# Patient Record
Sex: FEMALE | Race: WHITE | URBAN - METROPOLITAN AREA
[De-identification: names, ages, dates, MRNs, and addresses within clinical notes are randomized per-mention and may not be internally consistent; named-entity substitution may affect disease eponyms.]

---

## 2019-03-12 ENCOUNTER — NEW PATIENT (OUTPATIENT)
Dept: URBAN - METROPOLITAN AREA CLINIC 24 | Facility: CLINIC | Age: 39
End: 2019-03-12
Payer: COMMERCIAL

## 2019-03-12 DIAGNOSIS — E11.3293 DIABETES MELLITUS TYPE 2 WITH MILD NON-PROLIFERATI: Primary | ICD-10-CM

## 2019-03-12 DIAGNOSIS — H43.812 VITREOUS DEGENERATION, LEFT EYE: ICD-10-CM

## 2019-03-12 PROCEDURE — 92134 CPTRZ OPH DX IMG PST SGM RTA: CPT | Performed by: OPTOMETRIST

## 2019-03-12 PROCEDURE — 92004 COMPRE OPH EXAM NEW PT 1/>: CPT | Performed by: OPTOMETRIST

## 2019-03-12 ASSESSMENT — VISUAL ACUITY
OD: 20/20
OS: 20/20

## 2019-03-12 ASSESSMENT — INTRAOCULAR PRESSURE
OD: 18
OS: 20

## 2021-04-20 ENCOUNTER — OFFICE VISIT (OUTPATIENT)
Dept: URBAN - METROPOLITAN AREA CLINIC 26 | Facility: CLINIC | Age: 41
End: 2021-04-20
Payer: COMMERCIAL

## 2021-04-20 DIAGNOSIS — E11.3311 TYPE 2 DIAB W MODERATE NONPRLF DIAB RTNOP W MACULAR EDEMA, RIGHT EYE: ICD-10-CM

## 2021-04-20 PROCEDURE — 92134 CPTRZ OPH DX IMG PST SGM RTA: CPT | Performed by: OPTOMETRIST

## 2021-04-20 PROCEDURE — 99214 OFFICE O/P EST MOD 30 MIN: CPT | Performed by: OPTOMETRIST

## 2021-04-20 ASSESSMENT — INTRAOCULAR PRESSURE
OS: 20
OD: 16

## 2021-04-20 NOTE — IMPRESSION/PLAN
Impression: Type 2 diab w moderate nonprlf diab rtnop w macular edema, right eye: [id number]. Plan: pt ed of findings. recommend increased glucose control. consult with retina 1-2 weeks.

## 2021-05-13 ENCOUNTER — OFFICE VISIT (OUTPATIENT)
Dept: URBAN - METROPOLITAN AREA CLINIC 24 | Facility: CLINIC | Age: 41
End: 2021-05-13
Payer: COMMERCIAL

## 2021-05-13 DIAGNOSIS — H43.12 VITREOUS HEMORRHAGE, LEFT EYE: ICD-10-CM

## 2021-05-13 DIAGNOSIS — E11.3513 TYPE 2 DIABETES MELLITUS W/ PROLIFERATIVE DIABETIC RETINOPATHY W/ MACULAR EDEMA, BILATERAL: Primary | ICD-10-CM

## 2021-05-13 PROCEDURE — 92004 COMPRE OPH EXAM NEW PT 1/>: CPT | Performed by: OPHTHALMOLOGY

## 2021-05-13 PROCEDURE — 67028 INJECTION EYE DRUG: CPT | Performed by: OPHTHALMOLOGY

## 2021-05-13 PROCEDURE — 92134 CPTRZ OPH DX IMG PST SGM RTA: CPT | Performed by: OPHTHALMOLOGY

## 2021-05-13 ASSESSMENT — INTRAOCULAR PRESSURE
OD: 15
OS: 17

## 2021-05-13 NOTE — IMPRESSION/PLAN
Impression: Type 2 diabetes mellitus w/ proliferative diabetic retinopathy w/ macular edema, bilateral: L02.1928. Plan: PDR SEVERE OS > OD w years of poor control w HMG OS > OD. Pt now striving to improve BG  A1c 10s -->  Now doing Keto diet and Insulin TODAY Avastin OS injx (proc note) RTC 4-6w FA baseline and plan Avastin OU (BOTH eyes). [place] AUDREY Arguello for pt.   Urged efforts

## 2021-05-13 NOTE — IMPRESSION/PLAN
Impression: Vitreous hemorrhage, left eye: H43.12. Plan:  2/2 PDR OS. pt denies photopsia. WILL NEED PRP Laser OS > OD.   
  May need VIT to remove Hmg OS

## 2021-06-10 ENCOUNTER — OFFICE VISIT (OUTPATIENT)
Dept: URBAN - METROPOLITAN AREA CLINIC 24 | Facility: CLINIC | Age: 41
End: 2021-06-10
Payer: COMMERCIAL

## 2021-06-10 PROCEDURE — 92235 FLUORESCEIN ANGRPH MLTIFRAME: CPT | Performed by: OPHTHALMOLOGY

## 2021-06-10 PROCEDURE — 92134 CPTRZ OPH DX IMG PST SGM RTA: CPT | Performed by: OPHTHALMOLOGY

## 2021-06-10 ASSESSMENT — INTRAOCULAR PRESSURE
OS: 17
OD: 14

## 2021-06-10 NOTE — IMPRESSION/PLAN
Impression: Vitreous hemorrhage, left Plan: 2/2 PDR OS. WILL NEED PRP Laser OS > OD. D/t persisting NCVH OS, may need VIT to remove Hmg OS. IF NOT CLEAR in July '21, plan VIT OS.

## 2021-06-10 NOTE — IMPRESSION/PLAN
Impression: Type 2 diabetes mellitus w/ proliferative diabetic retinopathy w/ macular edema, bilateral: I66.1095. Plan: hx: [[PDR SEVERE OS > OD w years of poor control w HMG OS > OD. Pt now striving to improve BG  A1c 10s -->  Now doing Keto diet and Insulin - HMG OS worse]] TODAY Avastin OU injx (proc note) RTC 4-6w ND/inj/OCT and plan Avastin OU (BOTH eyes). [place] C for pt.   Urged efforts - she is striving w Keto / BG

## 2021-07-22 ENCOUNTER — OFFICE VISIT (OUTPATIENT)
Dept: URBAN - METROPOLITAN AREA CLINIC 24 | Facility: CLINIC | Age: 41
End: 2021-07-22
Payer: COMMERCIAL

## 2021-07-22 DIAGNOSIS — Z79.4 LONG TERM (CURRENT) USE OF INSULIN: ICD-10-CM

## 2021-07-22 PROCEDURE — 92134 CPTRZ OPH DX IMG PST SGM RTA: CPT | Performed by: OPHTHALMOLOGY

## 2021-07-22 ASSESSMENT — INTRAOCULAR PRESSURE
OS: 12
OD: 12

## 2021-07-22 NOTE — IMPRESSION/PLAN
Impression: DM2 w PDR prolif retinopathy w/ DME OU Plan: hx: [[PDR SEVERE OS > OD w years of poor control w HMG OS > OD. Pt now striving to improve BG  A1c 10s --> in '21 doing Keto diet and Insulin - HMG OS worse]] TODAY Avastin OU injx (proc note) RTC 6w dil, OCT, eval - h/o Avastin OU (BOTH eyes). [place] AUDREY Arguello for pt. Urged efforts - she is striving w Keto / BG -- A1c IMPROVED 10's--> 8.2 and lost 16lb in Summer '21!!

## 2021-09-02 ENCOUNTER — OFFICE VISIT (OUTPATIENT)
Dept: URBAN - METROPOLITAN AREA CLINIC 24 | Facility: CLINIC | Age: 41
End: 2021-09-02
Payer: COMMERCIAL

## 2021-09-02 PROCEDURE — 99214 OFFICE O/P EST MOD 30 MIN: CPT | Performed by: OPHTHALMOLOGY

## 2021-09-02 PROCEDURE — 92134 CPTRZ OPH DX IMG PST SGM RTA: CPT | Performed by: OPHTHALMOLOGY

## 2021-09-02 ASSESSMENT — INTRAOCULAR PRESSURE
OD: 16
OS: 16

## 2021-09-02 NOTE — IMPRESSION/PLAN
Impression: Use of insulin: Z79.4. Plan: see other diabetic plan   - pt is STRIVING to improve.   BG / Wt / diet BETTER

## 2021-09-02 NOTE — IMPRESSION/PLAN
Impression: Vitreous hemorrhage, left Plan:  D/t persisting NCVH OS, may need VIT to remove Hmg OS. THIS STILL IS NOT CLEAR in Summer '21, OK to plan VIT OS w PRP laser.

## 2021-09-02 NOTE — IMPRESSION/PLAN
Impression: DM2 w PDR prolif retinopathy w/ DME OU  - Trial injx Plan: hx: [[PDR SEVERE OS > OD w years of poor control w HMG OS > OD. Pt now striving to improve BG  A1c 10s --> in '21 doing Keto diet and Insulin - HMG OS worse]] TODAY Avastin OU injx (proc note) RTC 2-3w for VIT / Laser Light PRP / Fltx / Avastin part. AFx OS for persist NCVH OS
    RTC 6w (will be at about the POW#2 for VIT OS) FA / plan  Avastin OU. THEREAFTER SUSPEND injx OS . . . Do a couple more OD then plan [id number]. Urged efforts - she is striving w Keto / BG -- A1c IMPROVED 10's--> 8.2 and lost 16lb in Summer '21!!  Encouraged pt efforts!

## 2021-09-08 ENCOUNTER — ADULT PHYSICAL (OUTPATIENT)
Dept: URBAN - METROPOLITAN AREA CLINIC 30 | Facility: CLINIC | Age: 41
End: 2021-09-08
Payer: COMMERCIAL

## 2021-09-08 DIAGNOSIS — Z01.818 ENCOUNTER FOR OTHER PREPROCEDURAL EXAMINATION: Primary | ICD-10-CM

## 2021-09-08 PROCEDURE — 99203 OFFICE O/P NEW LOW 30 MIN: CPT | Performed by: PHYSICIAN ASSISTANT

## 2021-09-08 RX ORDER — ATORVASTATIN CALCIUM 40 MG/1
40 MG TABLET, FILM COATED ORAL
Qty: 0 | Refills: 0 | Status: ACTIVE
Start: 2021-09-08

## 2021-09-08 RX ORDER — FLUOXETINE 40 MG/1
40 MG CAPSULE ORAL
Qty: 0 | Refills: 0 | Status: ACTIVE
Start: 2021-09-08

## 2021-09-08 RX ORDER — LISINOPRIL 10 MG/1
10 MG TABLET ORAL
Qty: 0 | Refills: 0 | Status: ACTIVE
Start: 2021-09-08

## 2021-09-16 ENCOUNTER — SURGERY (OUTPATIENT)
Dept: URBAN - METROPOLITAN AREA SURGERY 12 | Facility: SURGERY | Age: 41
End: 2021-09-16
Payer: COMMERCIAL

## 2021-09-16 PROCEDURE — 67040 LASER TREATMENT OF RETINA: CPT | Performed by: OPHTHALMOLOGY

## 2021-09-17 ENCOUNTER — POST-OPERATIVE VISIT (OUTPATIENT)
Dept: URBAN - METROPOLITAN AREA CLINIC 26 | Facility: CLINIC | Age: 41
End: 2021-09-17
Payer: COMMERCIAL

## 2021-09-17 DIAGNOSIS — Z48.810 ENCOUNTER FOR SURGICAL AFTERCARE FOLLOWING SURGERY ON A SENSE ORGAN: Primary | ICD-10-CM

## 2021-09-17 PROCEDURE — 99024 POSTOP FOLLOW-UP VISIT: CPT | Performed by: OPTOMETRIST

## 2021-09-17 ASSESSMENT — INTRAOCULAR PRESSURE: OS: 21

## 2021-09-17 NOTE — IMPRESSION/PLAN
Impression: S/P Posterior Vitrectomy (PPVIT) /MP/ Laser/ Fltx / Avastin/Afx OS - 1 Day. Encounter for surgical aftercare following surgery on a sense organ  Z48.810.  Excellent post op course   Post operative instructions reviewed - Condition is improving - Plan: ofloxacin tid OS x 1 week
pred tid OS x 1week with weekly taper
rtc as scheduled with Dr. Skylar Harris or prn

## 2021-09-30 ENCOUNTER — OFFICE VISIT (OUTPATIENT)
Dept: URBAN - METROPOLITAN AREA CLINIC 24 | Facility: CLINIC | Age: 41
End: 2021-09-30
Payer: COMMERCIAL

## 2021-09-30 PROCEDURE — 99024 POSTOP FOLLOW-UP VISIT: CPT | Performed by: OPHTHALMOLOGY

## 2021-09-30 PROCEDURE — 92134 CPTRZ OPH DX IMG PST SGM RTA: CPT | Performed by: OPHTHALMOLOGY

## 2021-09-30 NOTE — IMPRESSION/PLAN
Impression: Vitreous hemorrhage, left GONE Plan: D/t persisting NCVH OS, DID do VIT to remove HMG OS -- See other plan